# Patient Record
Sex: MALE | Race: BLACK OR AFRICAN AMERICAN | NOT HISPANIC OR LATINO | Employment: OTHER | URBAN - METROPOLITAN AREA
[De-identification: names, ages, dates, MRNs, and addresses within clinical notes are randomized per-mention and may not be internally consistent; named-entity substitution may affect disease eponyms.]

---

## 2025-05-13 ENCOUNTER — HOSPITAL ENCOUNTER (EMERGENCY)
Facility: HOSPITAL | Age: 60
Discharge: HOME | End: 2025-05-13
Attending: EMERGENCY MEDICINE
Payer: MEDICARE

## 2025-05-13 ENCOUNTER — APPOINTMENT (OUTPATIENT)
Dept: RADIOLOGY | Facility: HOSPITAL | Age: 60
End: 2025-05-13
Payer: MEDICARE

## 2025-05-13 ENCOUNTER — APPOINTMENT (OUTPATIENT)
Dept: CARDIOLOGY | Facility: HOSPITAL | Age: 60
End: 2025-05-13
Payer: MEDICARE

## 2025-05-13 VITALS
TEMPERATURE: 97.9 F | OXYGEN SATURATION: 99 % | SYSTOLIC BLOOD PRESSURE: 155 MMHG | RESPIRATION RATE: 16 BRPM | WEIGHT: 149 LBS | HEIGHT: 63 IN | HEART RATE: 75 BPM | DIASTOLIC BLOOD PRESSURE: 98 MMHG | BODY MASS INDEX: 26.4 KG/M2

## 2025-05-13 DIAGNOSIS — E87.79 OTHER HYPERVOLEMIA: Primary | ICD-10-CM

## 2025-05-13 LAB
ALBUMIN SERPL BCP-MCNC: 3.9 G/DL (ref 3.4–5)
ALP SERPL-CCNC: 91 U/L (ref 33–120)
ALT SERPL W P-5'-P-CCNC: 9 U/L (ref 10–52)
ANION GAP SERPL CALC-SCNC: 17 MMOL/L (ref 10–20)
AST SERPL W P-5'-P-CCNC: 18 U/L (ref 9–39)
BASOPHILS # BLD AUTO: 0.03 X10*3/UL (ref 0–0.1)
BASOPHILS NFR BLD AUTO: 0.6 %
BILIRUB SERPL-MCNC: 0.5 MG/DL (ref 0–1.2)
BNP SERPL-MCNC: 1183 PG/ML (ref 0–99)
BUN SERPL-MCNC: 44 MG/DL (ref 6–23)
CALCIUM SERPL-MCNC: 8.8 MG/DL (ref 8.6–10.3)
CARDIAC TROPONIN I PNL SERPL HS: 25 NG/L (ref 0–20)
CARDIAC TROPONIN I PNL SERPL HS: 29 NG/L (ref 0–20)
CHLORIDE SERPL-SCNC: 96 MMOL/L (ref 98–107)
CO2 SERPL-SCNC: 29 MMOL/L (ref 21–32)
CREAT SERPL-MCNC: 9.11 MG/DL (ref 0.5–1.3)
EGFRCR SERPLBLD CKD-EPI 2021: 6 ML/MIN/1.73M*2
EOSINOPHIL # BLD AUTO: 0.16 X10*3/UL (ref 0–0.7)
EOSINOPHIL NFR BLD AUTO: 3.2 %
ERYTHROCYTE [DISTWIDTH] IN BLOOD BY AUTOMATED COUNT: 19.3 % (ref 11.5–14.5)
GLUCOSE SERPL-MCNC: 98 MG/DL (ref 74–99)
HCT VFR BLD AUTO: 33.4 % (ref 41–52)
HGB BLD-MCNC: 10.6 G/DL (ref 13.5–17.5)
IMM GRANULOCYTES # BLD AUTO: 0.01 X10*3/UL (ref 0–0.7)
IMM GRANULOCYTES NFR BLD AUTO: 0.2 % (ref 0–0.9)
LYMPHOCYTES # BLD AUTO: 0.76 X10*3/UL (ref 1.2–4.8)
LYMPHOCYTES NFR BLD AUTO: 15.4 %
MAGNESIUM SERPL-MCNC: 1.98 MG/DL (ref 1.6–2.4)
MCH RBC QN AUTO: 28 PG (ref 26–34)
MCHC RBC AUTO-ENTMCNC: 31.7 G/DL (ref 32–36)
MCV RBC AUTO: 88 FL (ref 80–100)
MONOCYTES # BLD AUTO: 0.53 X10*3/UL (ref 0.1–1)
MONOCYTES NFR BLD AUTO: 10.8 %
NEUTROPHILS # BLD AUTO: 3.44 X10*3/UL (ref 1.2–7.7)
NEUTROPHILS NFR BLD AUTO: 69.8 %
NRBC BLD-RTO: 0 /100 WBCS (ref 0–0)
PLATELET # BLD AUTO: 131 X10*3/UL (ref 150–450)
POTASSIUM SERPL-SCNC: 4.3 MMOL/L (ref 3.5–5.3)
PROT SERPL-MCNC: 6.9 G/DL (ref 6.4–8.2)
RBC # BLD AUTO: 3.78 X10*6/UL (ref 4.5–5.9)
SODIUM SERPL-SCNC: 138 MMOL/L (ref 136–145)
WBC # BLD AUTO: 4.9 X10*3/UL (ref 4.4–11.3)

## 2025-05-13 PROCEDURE — 99285 EMERGENCY DEPT VISIT HI MDM: CPT | Mod: 25 | Performed by: EMERGENCY MEDICINE

## 2025-05-13 PROCEDURE — 93308 TTE F-UP OR LMTD: CPT

## 2025-05-13 PROCEDURE — 83880 ASSAY OF NATRIURETIC PEPTIDE: CPT

## 2025-05-13 PROCEDURE — 2500000001 HC RX 250 WO HCPCS SELF ADMINISTERED DRUGS (ALT 637 FOR MEDICARE OP): Performed by: EMERGENCY MEDICINE

## 2025-05-13 PROCEDURE — 36415 COLL VENOUS BLD VENIPUNCTURE: CPT

## 2025-05-13 PROCEDURE — 71045 X-RAY EXAM CHEST 1 VIEW: CPT | Performed by: RADIOLOGY

## 2025-05-13 PROCEDURE — 93005 ELECTROCARDIOGRAM TRACING: CPT

## 2025-05-13 PROCEDURE — 96374 THER/PROPH/DIAG INJ IV PUSH: CPT | Mod: 59

## 2025-05-13 PROCEDURE — 84484 ASSAY OF TROPONIN QUANT: CPT

## 2025-05-13 PROCEDURE — 2500000004 HC RX 250 GENERAL PHARMACY W/ HCPCS (ALT 636 FOR OP/ED): Mod: JZ

## 2025-05-13 PROCEDURE — 83735 ASSAY OF MAGNESIUM: CPT

## 2025-05-13 PROCEDURE — 84075 ASSAY ALKALINE PHOSPHATASE: CPT

## 2025-05-13 PROCEDURE — 85025 COMPLETE CBC W/AUTO DIFF WBC: CPT

## 2025-05-13 PROCEDURE — 71045 X-RAY EXAM CHEST 1 VIEW: CPT

## 2025-05-13 PROCEDURE — 2500000001 HC RX 250 WO HCPCS SELF ADMINISTERED DRUGS (ALT 637 FOR MEDICARE OP)

## 2025-05-13 RX ORDER — ASPIRIN 81 MG/1
81 TABLET ORAL DAILY
COMMUNITY

## 2025-05-13 RX ORDER — HYDROXYZINE HYDROCHLORIDE 50 MG/1
50 TABLET, FILM COATED ORAL
COMMUNITY

## 2025-05-13 RX ORDER — TORSEMIDE 20 MG/1
20 TABLET ORAL DAILY
COMMUNITY

## 2025-05-13 RX ORDER — HYDRALAZINE HYDROCHLORIDE 50 MG/1
50 TABLET, FILM COATED ORAL ONCE
Status: COMPLETED | OUTPATIENT
Start: 2025-05-13 | End: 2025-05-13

## 2025-05-13 RX ORDER — CARVEDILOL 25 MG/1
25 TABLET ORAL 2 TIMES DAILY
COMMUNITY

## 2025-05-13 RX ORDER — ACETAMINOPHEN 325 MG/1
650 TABLET ORAL ONCE
Status: COMPLETED | OUTPATIENT
Start: 2025-05-13 | End: 2025-05-13

## 2025-05-13 RX ORDER — HYDRALAZINE HYDROCHLORIDE 50 MG/1
TABLET, FILM COATED ORAL
COMMUNITY

## 2025-05-13 RX ORDER — GABAPENTIN 100 MG/1
100 CAPSULE ORAL 3 TIMES DAILY
COMMUNITY

## 2025-05-13 RX ORDER — PANTOPRAZOLE SODIUM 40 MG/1
40 TABLET, DELAYED RELEASE ORAL
COMMUNITY

## 2025-05-13 RX ORDER — FUROSEMIDE 10 MG/ML
40 INJECTION INTRAMUSCULAR; INTRAVENOUS ONCE
Status: COMPLETED | OUTPATIENT
Start: 2025-05-13 | End: 2025-05-13

## 2025-05-13 RX ORDER — ISOSORBIDE MONONITRATE 30 MG/1
30 TABLET, EXTENDED RELEASE ORAL DAILY
COMMUNITY

## 2025-05-13 RX ORDER — METHOCARBAMOL 500 MG/1
500 TABLET, FILM COATED ORAL 4 TIMES DAILY
COMMUNITY

## 2025-05-13 RX ORDER — DIVALPROEX SODIUM 250 MG/1
250 TABLET, FILM COATED, EXTENDED RELEASE ORAL DAILY
COMMUNITY

## 2025-05-13 RX ORDER — OXYCODONE HYDROCHLORIDE 5 MG/1
CAPSULE ORAL
COMMUNITY

## 2025-05-13 RX ORDER — SEVELAMER CARBONATE 800 MG/1
800 TABLET, FILM COATED ORAL
COMMUNITY

## 2025-05-13 RX ORDER — ACETAMINOPHEN 325 MG/1
TABLET ORAL
Status: COMPLETED
Start: 2025-05-13 | End: 2025-05-13

## 2025-05-13 RX ORDER — LABETALOL HYDROCHLORIDE 5 MG/ML
10 INJECTION, SOLUTION INTRAVENOUS EVERY 2 HOUR PRN
Status: CANCELLED | OUTPATIENT
Start: 2025-05-13

## 2025-05-13 RX ORDER — SERTRALINE HYDROCHLORIDE 50 MG/1
50 TABLET, FILM COATED ORAL DAILY
COMMUNITY

## 2025-05-13 RX ORDER — TRAZODONE HYDROCHLORIDE 50 MG/1
50 TABLET ORAL NIGHTLY
COMMUNITY

## 2025-05-13 RX ORDER — ATORVASTATIN CALCIUM 80 MG/1
80 TABLET, FILM COATED ORAL DAILY
COMMUNITY

## 2025-05-13 RX ORDER — CARVEDILOL 6.25 MG/1
25 TABLET ORAL ONCE
Status: COMPLETED | OUTPATIENT
Start: 2025-05-13 | End: 2025-05-13

## 2025-05-13 RX ADMIN — FUROSEMIDE 40 MG: 10 INJECTION, SOLUTION INTRAMUSCULAR; INTRAVENOUS at 16:36

## 2025-05-13 RX ADMIN — ACETAMINOPHEN 650 MG: 325 TABLET ORAL at 19:56

## 2025-05-13 RX ADMIN — CARVEDILOL 25 MG: 6.25 TABLET, FILM COATED ORAL at 17:27

## 2025-05-13 RX ADMIN — HYDRALAZINE HYDROCHLORIDE 50 MG: 50 TABLET ORAL at 16:36

## 2025-05-13 ASSESSMENT — COLUMBIA-SUICIDE SEVERITY RATING SCALE - C-SSRS
1. IN THE PAST MONTH, HAVE YOU WISHED YOU WERE DEAD OR WISHED YOU COULD GO TO SLEEP AND NOT WAKE UP?: NO
2. HAVE YOU ACTUALLY HAD ANY THOUGHTS OF KILLING YOURSELF?: NO
6. HAVE YOU EVER DONE ANYTHING, STARTED TO DO ANYTHING, OR PREPARED TO DO ANYTHING TO END YOUR LIFE?: NO

## 2025-05-13 ASSESSMENT — PAIN - FUNCTIONAL ASSESSMENT: PAIN_FUNCTIONAL_ASSESSMENT: 0-10

## 2025-05-13 ASSESSMENT — PAIN DESCRIPTION - LOCATION: LOCATION: HEAD

## 2025-05-13 ASSESSMENT — PAIN DESCRIPTION - PAIN TYPE: TYPE: ACUTE PAIN

## 2025-05-13 ASSESSMENT — PAIN SCALES - GENERAL: PAINLEVEL_OUTOF10: 9

## 2025-05-13 NOTE — ED PROVIDER NOTES
Emergency Department Provider Note        History of Present Illness     History provided by: Patient  Limitations to History: None  External Records Reviewed with Brief Summary: None    HPI:  Kamlesh Baig is a 60 y.o. male with a history of ESRD on HD M/W/F and triple bypass presenting to the ED with a complaint of high blood pressure.  Patient sent to the ED by [facility] due to concern of his blood pressure being in the 200s and patient reports hemodialysis being postponed/declined due to his blood pressure being too elevated 3 times.  Also reports shortness of breath since the triple bypass procedure  that has been constant but he notices improves when sitting up.  Is he still produces urine.  Notes that his R fistula UE burst 1 month ago and has been utilizing a right upper chest wall port.  States that they removed 3 L yesterday.  Denies chest pain, N/V/D, headache, changes in vision, difficulty speaking, or one-sided weakness.    Physical Exam   Triage vitals:  T 36.6 °C (97.9 °F)  HR 70  BP (!) 209/97  RR 17  O2 97 % None (Room air)    General: Awake, alert, in no acute distress  Eyes: Gaze conjugate.  No scleral icterus or injection  HENT: Normo-cephalic, atraumatic. No stridor  CV: Regular rate, regular rhythm. Radial pulses 2+ bilaterally right chest wall port clean, dry, Without surrounding erythema.  Resp: Breathing non-labored, speaking in full sentences.  Clear to auscultation bilaterally  GI: Soft, non-distended, non-tender. No rebound or guarding.  MSK/Extremities: No gross bony deformities. Moving all extremities.  Skin: Warm. Appropriate color  Neuro: Alert. Oriented. Face symmetric. Speech is fluent.  Gross strength and sensation intact in b/l UE and LEs  Psych: Appropriate mood and affect    Medical Decision Making & ED Course   Medical Decision Makin y.o. male with a history significant for ESRD on HD M/W/F with a prescription of torsemide 20 mg days off  dialysis coming to the ED with a complaint of high blood pressure readings in the 200s SBP delaying dialysis appointments.  Patient is afebrile, sinus, hypertensive, saturating well on room air, and in no acute distress.  On exam he  he is sitting upright and has clear breath sounds.  No edema of the lower extremities.  Cardiac workup performed including POCUS and BNP.  POCUS reveals no pericardial effusion or D sign.  B-lines appreciated.  Patient provided 40 mg Lasix.  Began endorsing a frontal headache at a 4/10 in which he was provided Tylenol.  Patient is in fact  prescribed antihypertensives including Coreg and hydralazine which was provided.  ----  Differential diagnoses considered include but are not limited to: ACS, AHF, Pneumonia, COPD exacerbation, Mucous plugging, ARDS, Myocarditis, Pericarditis, Anxiety/Panic attack     Social Determinants of Health which Significantly Impact Care: None identified     EKG Independent Interpretation: EKG at 1343. Ventricular Rate: 71. Rhythm: Sinus. LAD. No acute ischemic changes. QTc: Prolonged at 508 ms.    Independent Result Review and Interpretation: Relevant laboratory and radiographic results were reviewed and independently interpreted by myself.  As necessary, they are commented on in the ED Course.    Chronic conditions affecting the patient's care: As documented above in [facility]    The patient was discussed with the following consultants/services: None    Care Considerations: As documented above in [facility]    ED Course:  ED Course as of 05/13/25 2133   Tue May 13, 2025   1615 BNP(!): 1,183  Concerning for fluid overload. [ES]   1616 XR chest 1 view  Asymmetric pulmonary edema in the setting of shortness of breath and elevated BNP.  Patient has no leukocytosis or productive cough to warrant concern for pneumonia.  40 mg IV Lasix provided. [ES]   1747 Patient signed out to me at this time.  Mild to moderate fluid overload in setting of ESRD status.  Pulmonary edema noted  on x-ray.  Thus far patient has had persistent elevated blood pressure not responsive to diuretic, hydralazine, oral carvedilol.  Should blood pressure remain elevated for the next 20 to 30 minutes plan would be to initiate IV antihypertensive in the form of labetalol and admit patient. [TL]   1828 BP improved to 152/102   [TL]   1835 On reassessment no chest pain or dyspnea at rest [CB]   1836 Shared decision making for disposition  Patient and/or patient´s representative was counseled regarding labs, imaging, likely diagnosis. All questions were answered. Recommendation was made   for discharge home. The patient agreed and was discharged home in stable condition with appropriate relevant educational materials. Return precautions were provided which included worsening shortness of breath, difficulty breathing, chest pain, nausea, vomiting, weakness, fevers of 38C (100.4) or higher, numbness, tingling, excessive sweating, loss of motion in your arms or legs, or any new or worsening symptoms.    [CB]   1856 We did offer and hospitalization and given persistent elevated blood pressure this was a concern however patient reports that he already has arranged dialysis in the morning.  He reports that the shortness of breath is a chronic complaint for him at this time.  He is currently saturating 97 to 98% on room air with no conversational dyspnea.  I advised continued follow-up with his nephrologist and outpatient team regarding his blood pressure.  His troponins are stable/indeterminate given his ESRD status.  I do not suspect hypertensive emergency.  We advised him to discuss with the [facility] nephrologist regarding removal of additional fluid at tomorrow's dialysis session.  He expressed understanding. [TL]      ED Course User Index  [CB] Francesco Castillo DO  [ES] Paulino Page MD  [TL] Abhay Leon DO         Diagnoses as of 05/13/25 2133   Other hypervolemia     Disposition   Patient signed-out to Dr. Castillo at 1700  pending completion of his work-up. Blood pressure continues to be elevated pending action of anti-hypertensives and lasix.    Procedures     Performed by: Paulino Page MD  Authorized by: Paulino Page MD    Cardiac Indications: fluid overload and orthopnea dyspnea              Respiratory Indications: shortness of breath    Procedure: Cardiac Ultrasound    Findings:   Views: parasternal long, parasternal short and apical four  The pericardial space was visualized and was NEGATIVE for a significant pericardial effusion.  Activity: Ventricular contractions were visualized.  LV: LV systolic function was NORMAL.  RV: RV size was NORMAL.    Impression:  Cardiac: The focused cardiac ultrasound exam was NORMAL.    Procedure: Thoracic Ultrasound    Findings:  R Lung Sliding: The RIGHT chest was evaluated and LUNG SLIDING was visualized.  L Lung Sliding: The LEFT chest was evaluated and LUNG SLIDING was visualized.        B-lines:   right B-lines present and   left B-lines present        Impression:  Thorax: The focused thoracic ultrasound exam had ABNORMAL findings as specified.          This was a shared visit with an ED attending.  The patient was seen and discussed with the ED attending    Paulino Page MD  Emergency Medicine, PGY3     Paulino Page MD  Resident  05/13/25 2779

## 2025-05-13 NOTE — PROGRESS NOTES
Emergency Medicine Transition of Care Note.    I received Kamlesh Baig in signout from Dr. Page.  Please see the previous ED provider note for all HPI, PE and MDM up to the time of signout at 1700. This is in addition to the primary record.    In brief Kamlesh Baig is an 60 y.o. male presenting for   Chief Complaint   Patient presents with    Hypertension     At the time of signout we were awaiting: reassessment, dispo    Medical Decision Making      ED Course as of 05/14/25 0037   Tue May 13, 2025   1615 BNP(!): 1,183  Concerning for fluid overload. [ES]   1616 XR chest 1 view  Asymmetric pulmonary edema in the setting of shortness of breath and elevated BNP.  Patient has no leukocytosis or productive cough to warrant concern for pneumonia.  40 mg IV Lasix provided. [ES]   1747 Patient signed out to me at this time.  Mild to moderate fluid overload in setting of ESRD status.  Pulmonary edema noted on x-ray.  Thus far patient has had persistent elevated blood pressure not responsive to diuretic, hydralazine, oral carvedilol.  Should blood pressure remain elevated for the next 20 to 30 minutes plan would be to initiate IV antihypertensive in the form of labetalol and admit patient. [TL]   1828 BP improved to 152/102   [TL]   1835 On reassessment no chest pain or dyspnea at rest [CB]   1836 Shared decision making for disposition  Patient and/or patient´s representative was counseled regarding labs, imaging, likely diagnosis. All questions were answered. Recommendation was made   for discharge home. The patient agreed and was discharged home in stable condition with appropriate relevant educational materials. Return precautions were provided which included worsening shortness of breath, difficulty breathing, chest pain, nausea, vomiting, weakness, fevers of 38C (100.4) or higher, numbness, tingling, excessive sweating, loss of motion in your arms or legs, or any new or worsening symptoms.    [CB]   1856 We did  offer and hospitalization and given persistent elevated blood pressure this was a concern however patient reports that he already has arranged dialysis in the morning.  He reports that the shortness of breath is a chronic complaint for him at this time.  He is currently saturating 97 to 98% on room air with no conversational dyspnea.  I advised continued follow-up with his nephrologist and outpatient team regarding his blood pressure.  His troponins are stable/indeterminate given his ESRD status.  I do not suspect hypertensive emergency.  We advised him to discuss with the [facility] nephrologist regarding removal of additional fluid at tomorrow's dialysis session.  He expressed understanding. [TL]      ED Course User Index  [CB] Juan Jose Vazquez DO  [ES] Paulino Page MD  [TL] Abhay Leon DO         Diagnoses as of 05/14/25 0037   Other hypervolemia         Final diagnoses:   [E87.79] Other hypervolemia           Procedure  Procedures    Juan Jose Vazquez DO     Reviewed and approved by JUAN JOSE VAZQUEZ on 5/13/25 at 6:36 PM.    I performed a history and physical examination of the patient and discussed his management with the resident physician.  I agree with the history, physical, assessment, and plan of care, with the following exceptions:   None    I was present for key and critical portions of the following procedures: None  Time Spent in Critical Care of the patient: None  Time spent in discussions with the patient and family: 30    Abhay Leon DO

## 2025-05-13 NOTE — DISCHARGE INSTRUCTIONS
You were seen in the Emergency Department (ED) for a shortness of breath. No critical or emergent cause was found in your work-up and evaluation including your oxygenation levels and heart rhythm.    Your chest x-ray is concerning for fluid overload.  Please speak with your cardiologist and nephrologist regarding shortness of breath, high blood pressure, and fluids.    Seek immediate medical attention if you develop: worsening shortness of breath, difficulty breathing, chest pain, nausea, vomiting, weakness, fevers of 38C (100.4) or higher, numbness, tingling, excessive sweating, loss of motion in your arms or legs, or any new or worsening symptoms.

## 2025-05-14 LAB — HOLD SPECIMEN: 293

## 2025-05-15 LAB
ATRIAL RATE: 71 BPM
P AXIS: 64 DEGREES
P OFFSET: 187 MS
P ONSET: 135 MS
PR INTERVAL: 164 MS
Q ONSET: 217 MS
QRS COUNT: 12 BEATS
QRS DURATION: 84 MS
QT INTERVAL: 468 MS
QTC CALCULATION(BAZETT): 508 MS
QTC FREDERICIA: 495 MS
R AXIS: -44 DEGREES
T AXIS: -75 DEGREES
T OFFSET: 451 MS
VENTRICULAR RATE: 71 BPM